# Patient Record
Sex: MALE | Race: WHITE | Employment: FULL TIME | URBAN - METROPOLITAN AREA
[De-identification: names, ages, dates, MRNs, and addresses within clinical notes are randomized per-mention and may not be internally consistent; named-entity substitution may affect disease eponyms.]

---

## 2018-06-18 ENCOUNTER — TELEPHONE (OUTPATIENT)
Dept: INTERNAL MEDICINE CLINIC | Age: 63
End: 2018-06-18

## 2018-07-03 ENCOUNTER — OFFICE VISIT (OUTPATIENT)
Dept: INTERNAL MEDICINE CLINIC | Age: 63
End: 2018-07-03

## 2018-07-03 VITALS
BODY MASS INDEX: 23.04 KG/M2 | HEIGHT: 68 IN | DIASTOLIC BLOOD PRESSURE: 60 MMHG | SYSTOLIC BLOOD PRESSURE: 108 MMHG | HEART RATE: 64 BPM | WEIGHT: 152 LBS | RESPIRATION RATE: 16 BRPM

## 2018-07-03 DIAGNOSIS — M72.2 PLANTAR FASCIITIS: Chronic | ICD-10-CM

## 2018-07-03 DIAGNOSIS — F41.9 ANXIETY AND DEPRESSION: Chronic | ICD-10-CM

## 2018-07-03 DIAGNOSIS — F32.A ANXIETY AND DEPRESSION: Chronic | ICD-10-CM

## 2018-07-03 PROCEDURE — 99203 OFFICE O/P NEW LOW 30 MIN: CPT | Performed by: INTERNAL MEDICINE

## 2018-07-03 RX ORDER — FLUOXETINE 20 MG/1
20 TABLET, FILM COATED ORAL DAILY
Qty: 30 TABLET | Refills: 3 | Status: SHIPPED | OUTPATIENT
Start: 2018-07-03 | End: 2019-03-16

## 2019-01-02 RX ORDER — FLUOXETINE HYDROCHLORIDE 20 MG/1
CAPSULE ORAL
Qty: 90 CAPSULE | Refills: 3 | Status: SHIPPED | OUTPATIENT
Start: 2019-01-02

## 2019-03-16 ENCOUNTER — HOSPITAL ENCOUNTER (EMERGENCY)
Age: 64
Discharge: HOME | End: 2019-03-16
Payer: COMMERCIAL

## 2019-03-16 VITALS
HEART RATE: 88 BPM | DIASTOLIC BLOOD PRESSURE: 72 MMHG | WEIGHT: 153 LBS | BODY MASS INDEX: 23.26 KG/M2 | SYSTOLIC BLOOD PRESSURE: 126 MMHG | RESPIRATION RATE: 16 BRPM | OXYGEN SATURATION: 97 % | TEMPERATURE: 97.9 F

## 2019-03-16 DIAGNOSIS — S20.222A CONTUSION OF LEFT SIDE OF BACK, INITIAL ENCOUNTER: Primary | ICD-10-CM

## 2019-03-16 PROCEDURE — 99283 EMERGENCY DEPT VISIT LOW MDM: CPT

## 2019-03-16 RX ORDER — IBUPROFEN 800 MG/1
800 TABLET ORAL EVERY 8 HOURS PRN
Qty: 30 TABLET | Refills: 0 | Status: SHIPPED | OUTPATIENT
Start: 2019-03-16

## 2019-03-16 RX ORDER — HYDROCODONE BITARTRATE AND ACETAMINOPHEN 5; 325 MG/1; MG/1
1 TABLET ORAL EVERY 6 HOURS PRN
Qty: 10 TABLET | Refills: 0 | Status: SHIPPED | OUTPATIENT
Start: 2019-03-16 | End: 2019-03-19

## 2019-03-16 RX ORDER — METHOCARBAMOL 750 MG/1
750 TABLET, FILM COATED ORAL 2 TIMES DAILY
Qty: 20 TABLET | Refills: 0 | Status: SHIPPED | OUTPATIENT
Start: 2019-03-16

## 2019-03-16 ASSESSMENT — ENCOUNTER SYMPTOMS
CHOKING: 0
SHORTNESS OF BREATH: 0
VOMITING: 0
NAUSEA: 0
EYE DISCHARGE: 0
EYE REDNESS: 0
APNEA: 0
FACIAL SWELLING: 0
ABDOMINAL PAIN: 0
BACK PAIN: 1

## 2019-03-16 ASSESSMENT — PAIN DESCRIPTION - PAIN TYPE
TYPE: ACUTE PAIN
TYPE: ACUTE PAIN

## 2019-03-16 ASSESSMENT — PAIN DESCRIPTION - DESCRIPTORS: DESCRIPTORS: ACHING

## 2019-03-16 ASSESSMENT — PAIN SCALES - GENERAL
PAINLEVEL_OUTOF10: 6
PAINLEVEL_OUTOF10: 6

## 2019-03-16 ASSESSMENT — PAIN DESCRIPTION - PROGRESSION
CLINICAL_PROGRESSION: NOT CHANGED
CLINICAL_PROGRESSION: NOT CHANGED

## 2019-03-16 ASSESSMENT — PAIN DESCRIPTION - ONSET: ONSET: ON-GOING

## 2019-03-16 ASSESSMENT — PAIN DESCRIPTION - ORIENTATION
ORIENTATION: LEFT;LOWER
ORIENTATION: LEFT;LOWER

## 2019-03-16 ASSESSMENT — PAIN DESCRIPTION - FREQUENCY: FREQUENCY: CONTINUOUS

## 2019-03-16 ASSESSMENT — PAIN DESCRIPTION - LOCATION
LOCATION: BACK
LOCATION: BACK

## 2025-07-21 ENCOUNTER — ANESTHESIA (OUTPATIENT)
Dept: ENDOSCOPY | Age: 70
End: 2025-07-21
Payer: COMMERCIAL

## 2025-07-21 ENCOUNTER — ANESTHESIA EVENT (OUTPATIENT)
Dept: ENDOSCOPY | Age: 70
End: 2025-07-21
Payer: COMMERCIAL

## 2025-07-21 ENCOUNTER — APPOINTMENT (OUTPATIENT)
Dept: CT IMAGING | Age: 70
End: 2025-07-21
Payer: COMMERCIAL

## 2025-07-21 ENCOUNTER — HOSPITAL ENCOUNTER (EMERGENCY)
Age: 70
Discharge: HOME | End: 2025-07-21
Attending: EMERGENCY MEDICINE
Payer: COMMERCIAL

## 2025-07-21 VITALS
TEMPERATURE: 98.9 F | HEIGHT: 68 IN | WEIGHT: 145.94 LBS | DIASTOLIC BLOOD PRESSURE: 67 MMHG | OXYGEN SATURATION: 93 % | RESPIRATION RATE: 16 BRPM | SYSTOLIC BLOOD PRESSURE: 107 MMHG | HEART RATE: 80 BPM | BODY MASS INDEX: 22.12 KG/M2

## 2025-07-21 DIAGNOSIS — K13.70 LESION OF MOUTH: ICD-10-CM

## 2025-07-21 DIAGNOSIS — T18.108A FOREIGN BODY IN ESOPHAGUS, INITIAL ENCOUNTER: Primary | ICD-10-CM

## 2025-07-21 LAB
ANION GAP SERPL CALCULATED.3IONS-SCNC: 12 MMOL/L (ref 3–16)
BASOPHILS # BLD: 0.1 K/UL (ref 0–0.2)
BASOPHILS NFR BLD: 1 %
BUN SERPL-MCNC: 13 MG/DL (ref 7–20)
CALCIUM SERPL-MCNC: 8.9 MG/DL (ref 8.3–10.6)
CHLORIDE SERPL-SCNC: 112 MMOL/L (ref 99–110)
CO2 SERPL-SCNC: 23 MMOL/L (ref 21–32)
CREAT SERPL-MCNC: 0.8 MG/DL (ref 0.8–1.3)
DEPRECATED RDW RBC AUTO: 13.7 % (ref 12.4–15.4)
EOSINOPHIL # BLD: 0.3 K/UL (ref 0–0.6)
EOSINOPHIL NFR BLD: 3.8 %
GFR SERPLBLD CREATININE-BSD FMLA CKD-EPI: >90 ML/MIN/{1.73_M2}
GLUCOSE SERPL-MCNC: 133 MG/DL (ref 70–99)
HCT VFR BLD AUTO: 31 % (ref 40.5–52.5)
HGB BLD-MCNC: 10.7 G/DL (ref 13.5–17.5)
LYMPHOCYTES # BLD: 1.7 K/UL (ref 1–5.1)
LYMPHOCYTES NFR BLD: 22.5 %
MAGNESIUM SERPL-MCNC: 2.07 MG/DL (ref 1.8–2.4)
MCH RBC QN AUTO: 31.9 PG (ref 26–34)
MCHC RBC AUTO-ENTMCNC: 34.5 G/DL (ref 31–36)
MCV RBC AUTO: 92.5 FL (ref 80–100)
MONOCYTES # BLD: 0.4 K/UL (ref 0–1.3)
MONOCYTES NFR BLD: 5.5 %
NEUTROPHILS # BLD: 5.1 K/UL (ref 1.7–7.7)
NEUTROPHILS NFR BLD: 67.2 %
PLATELET # BLD AUTO: 265 K/UL (ref 135–450)
PMV BLD AUTO: 8.8 FL (ref 5–10.5)
POTASSIUM SERPL-SCNC: 3.3 MMOL/L (ref 3.5–5.1)
RBC # BLD AUTO: 3.35 M/UL (ref 4.2–5.9)
SODIUM SERPL-SCNC: 147 MMOL/L (ref 136–145)
WBC # BLD AUTO: 7.5 K/UL (ref 4–11)

## 2025-07-21 PROCEDURE — 83735 ASSAY OF MAGNESIUM: CPT

## 2025-07-21 PROCEDURE — 7100000011 HC PHASE II RECOVERY - ADDTL 15 MIN: Performed by: INTERNAL MEDICINE

## 2025-07-21 PROCEDURE — 7100000010 HC PHASE II RECOVERY - FIRST 15 MIN: Performed by: INTERNAL MEDICINE

## 2025-07-21 PROCEDURE — 6360000002 HC RX W HCPCS: Performed by: PHYSICIAN ASSISTANT

## 2025-07-21 PROCEDURE — 2580000003 HC RX 258: Performed by: PHYSICIAN ASSISTANT

## 2025-07-21 PROCEDURE — 6360000002 HC RX W HCPCS: Performed by: ANESTHESIOLOGY

## 2025-07-21 PROCEDURE — 99285 EMERGENCY DEPT VISIT HI MDM: CPT

## 2025-07-21 PROCEDURE — 7100000001 HC PACU RECOVERY - ADDTL 15 MIN: Performed by: INTERNAL MEDICINE

## 2025-07-21 PROCEDURE — 70491 CT SOFT TISSUE NECK W/DYE: CPT

## 2025-07-21 PROCEDURE — 7100000000 HC PACU RECOVERY - FIRST 15 MIN: Performed by: INTERNAL MEDICINE

## 2025-07-21 PROCEDURE — 96374 THER/PROPH/DIAG INJ IV PUSH: CPT

## 2025-07-21 PROCEDURE — 85025 COMPLETE CBC W/AUTO DIFF WBC: CPT

## 2025-07-21 PROCEDURE — 6360000004 HC RX CONTRAST MEDICATION: Performed by: PHYSICIAN ASSISTANT

## 2025-07-21 PROCEDURE — 3700000000 HC ANESTHESIA ATTENDED CARE: Performed by: INTERNAL MEDICINE

## 2025-07-21 PROCEDURE — 2580000003 HC RX 258: Performed by: ANESTHESIOLOGY

## 2025-07-21 PROCEDURE — 3700000001 HC ADD 15 MINUTES (ANESTHESIA): Performed by: INTERNAL MEDICINE

## 2025-07-21 PROCEDURE — 3609012900 HC EGD FOREIGN BODY REMOVAL: Performed by: INTERNAL MEDICINE

## 2025-07-21 PROCEDURE — 2709999900 HC NON-CHARGEABLE SUPPLY: Performed by: INTERNAL MEDICINE

## 2025-07-21 PROCEDURE — 80048 BASIC METABOLIC PNL TOTAL CA: CPT

## 2025-07-21 RX ORDER — SODIUM CHLORIDE 9 MG/ML
INJECTION, SOLUTION INTRAVENOUS PRN
Status: DISCONTINUED | OUTPATIENT
Start: 2025-07-21 | End: 2025-07-22 | Stop reason: HOSPADM

## 2025-07-21 RX ORDER — SODIUM CHLORIDE 0.9 % (FLUSH) 0.9 %
5-40 SYRINGE (ML) INJECTION PRN
Status: DISCONTINUED | OUTPATIENT
Start: 2025-07-21 | End: 2025-07-22 | Stop reason: HOSPADM

## 2025-07-21 RX ORDER — LIDOCAINE HYDROCHLORIDE 20 MG/ML
INJECTION, SOLUTION INFILTRATION; PERINEURAL
Status: DISCONTINUED | OUTPATIENT
Start: 2025-07-21 | End: 2025-07-21 | Stop reason: SDUPTHER

## 2025-07-21 RX ORDER — SODIUM CHLORIDE 9 MG/ML
INJECTION, SOLUTION INTRAVENOUS
Status: DISCONTINUED | OUTPATIENT
Start: 2025-07-21 | End: 2025-07-21 | Stop reason: SDUPTHER

## 2025-07-21 RX ORDER — ONDANSETRON 2 MG/ML
4 INJECTION INTRAMUSCULAR; INTRAVENOUS
Status: DISCONTINUED | OUTPATIENT
Start: 2025-07-21 | End: 2025-07-22 | Stop reason: HOSPADM

## 2025-07-21 RX ORDER — SODIUM CHLORIDE 9 MG/ML
INJECTION, SOLUTION INTRAVENOUS CONTINUOUS
Status: DISCONTINUED | OUTPATIENT
Start: 2025-07-21 | End: 2025-07-22 | Stop reason: HOSPADM

## 2025-07-21 RX ORDER — ONDANSETRON 2 MG/ML
INJECTION INTRAMUSCULAR; INTRAVENOUS
Status: DISCONTINUED | OUTPATIENT
Start: 2025-07-21 | End: 2025-07-21 | Stop reason: SDUPTHER

## 2025-07-21 RX ORDER — IOPAMIDOL 755 MG/ML
75 INJECTION, SOLUTION INTRAVASCULAR
Status: COMPLETED | OUTPATIENT
Start: 2025-07-21 | End: 2025-07-21

## 2025-07-21 RX ORDER — DEXAMETHASONE SODIUM PHOSPHATE 10 MG/ML
INJECTION, SOLUTION INTRAMUSCULAR; INTRAVENOUS
Status: DISCONTINUED | OUTPATIENT
Start: 2025-07-21 | End: 2025-07-21 | Stop reason: SDUPTHER

## 2025-07-21 RX ORDER — GLUCAGON 1 MG/ML
1 KIT INJECTION ONCE
Status: COMPLETED | OUTPATIENT
Start: 2025-07-21 | End: 2025-07-21

## 2025-07-21 RX ORDER — PROPOFOL 10 MG/ML
INJECTION, EMULSION INTRAVENOUS
Status: DISCONTINUED | OUTPATIENT
Start: 2025-07-21 | End: 2025-07-21 | Stop reason: SDUPTHER

## 2025-07-21 RX ORDER — SODIUM CHLORIDE 0.9 % (FLUSH) 0.9 %
5-40 SYRINGE (ML) INJECTION EVERY 12 HOURS SCHEDULED
Status: DISCONTINUED | OUTPATIENT
Start: 2025-07-21 | End: 2025-07-22 | Stop reason: HOSPADM

## 2025-07-21 RX ADMIN — GLUCAGON 1 MG: 1 INJECTION, POWDER, LYOPHILIZED, FOR SOLUTION INTRAMUSCULAR; INTRAVENOUS at 17:56

## 2025-07-21 RX ADMIN — DEXAMETHASONE SODIUM PHOSPHATE 10 MG: 10 INJECTION, SOLUTION INTRAMUSCULAR; INTRAVENOUS at 21:18

## 2025-07-21 RX ADMIN — SODIUM CHLORIDE: 9 INJECTION, SOLUTION INTRAVENOUS at 21:14

## 2025-07-21 RX ADMIN — IOPAMIDOL 75 ML: 755 INJECTION, SOLUTION INTRAVENOUS at 19:09

## 2025-07-21 RX ADMIN — ONDANSETRON 4 MG: 2 INJECTION INTRAMUSCULAR; INTRAVENOUS at 21:18

## 2025-07-21 RX ADMIN — LIDOCAINE HYDROCHLORIDE 80 MG: 20 INJECTION, SOLUTION INFILTRATION; PERINEURAL at 21:18

## 2025-07-21 RX ADMIN — PROPOFOL 120 MG: 10 INJECTION, EMULSION INTRAVENOUS at 21:18

## 2025-07-21 RX ADMIN — SODIUM CHLORIDE: 0.9 INJECTION, SOLUTION INTRAVENOUS at 20:38

## 2025-07-21 ASSESSMENT — PAIN DESCRIPTION - PAIN TYPE
TYPE: SURGICAL PAIN

## 2025-07-21 ASSESSMENT — PAIN DESCRIPTION - DESCRIPTORS
DESCRIPTORS: DISCOMFORT

## 2025-07-21 ASSESSMENT — PAIN DESCRIPTION - FREQUENCY
FREQUENCY: INTERMITTENT

## 2025-07-21 ASSESSMENT — ENCOUNTER SYMPTOMS
NAUSEA: 0
ABDOMINAL PAIN: 0
RHINORRHEA: 0
SHORTNESS OF BREATH: 0
TROUBLE SWALLOWING: 1
SORE THROAT: 0
DIARRHEA: 0
COLOR CHANGE: 0
VOMITING: 0
COUGH: 0
EYE REDNESS: 0

## 2025-07-21 ASSESSMENT — PAIN DESCRIPTION - ORIENTATION
ORIENTATION: INNER

## 2025-07-21 ASSESSMENT — PAIN SCALES - GENERAL
PAINLEVEL_OUTOF10: 0
PAINLEVEL_OUTOF10: 3
PAINLEVEL_OUTOF10: 3
PAINLEVEL_OUTOF10: 0
PAINLEVEL_OUTOF10: 3
PAINLEVEL_OUTOF10: 3
PAINLEVEL_OUTOF10: 0

## 2025-07-21 ASSESSMENT — LIFESTYLE VARIABLES
HOW OFTEN DO YOU HAVE A DRINK CONTAINING ALCOHOL: MONTHLY OR LESS
HOW MANY STANDARD DRINKS CONTAINING ALCOHOL DO YOU HAVE ON A TYPICAL DAY: 1 OR 2

## 2025-07-21 ASSESSMENT — PAIN DESCRIPTION - LOCATION
LOCATION: THROAT

## 2025-07-21 ASSESSMENT — PAIN DESCRIPTION - ONSET
ONSET: ON-GOING

## 2025-07-22 NOTE — DISCHARGE INSTRUCTIONS
Endoscopy Discharge Instructions    Call Dr. Muro [phone] if you have any questions.    You may be drowsy or lightheaded after receiving sedation.  DO NOT operate  a vehicle (automobile, bicycle, motorcycle, machinery, or power tools), no  alcoholic beverages, and do not make any important decisions today.                 Plan on bed rest or quiet relaxation today.  Resume normal activities in the morning.     Resume normal activity tomorrow unless otherwise advised by your physician.            Eat a light first meal, avoiding spicy and fatty foods, then resume normal diet unless  you are told otherwise by your physician.    If the intravenous medication site is painful, apply warm compresses on the site until the soreness is relieved and elevate the arm above the heart. Call your physician if no improvement  in 2-3 days.       POSSIBLE SYMPTOMS TO WATCH:     1. fever (greater than 100) 5. increased abdominal bloating   2. severe pain   6. excessive bleeding   3. nausea and vomiting  7. chest pain   4. chills    8. shortness of breath       Notify us if these problems occur     Expected as normal and remedies:  Sore throat: use over the counter throat lozenges or gargle with warm salt water.  Redness or soreness at the IV site: apply warm compress  Gaseous discomfort: belching or passing flatus (gas).    Findings:         Signed                             Endoscopy Note                   Findings:     1) Retained esophageal meat bolus (? Corndog) was noted in the cervical esophagus just distal to the esophageal inlet.  There was no obvious stricture or mass noted in the upper esophagus.  The mucosal in the proximal esophagus was irritated likely from the mechanical trauma of the food bolus.   2) The remainder of the stomach was normal appearing.  The food bolus was able to be gently advanced into the stomach without difficulty.   3) The gastroesophageal junction was at 38 cm from the incisors.  There was

## 2025-07-22 NOTE — ANESTHESIA PRE PROCEDURE
[facility] Department of Anesthesiology  Pre-Anesthesia Evaluation/Consultation       Name:  Prince Max  : 1955  Age:  70 y.o.                                           MRN:  [id number]  Date: 2025           Surgeon: Surgeon(s):  Ulises Lee Jr., DO    Procedure: Procedure(s):  ESOPHAGOGASTRODUODENOSCOPY     No Known Allergies  Patient Active Problem List   Diagnosis    Plantar fasciitis    Anxiety and depression     Past Medical History:   Diagnosis Date    Anxiety and depression 2018    Dysphagia 2025    foreign body in esophagus     No past surgical history on file.  Social History     Tobacco Use    Smoking status: Every Day     Current packs/day: 0.20     Average packs/day: 0.2 packs/day for 15.0 years (3.0 ttl pk-yrs)     Types: Cigarettes    Smokeless tobacco: Never   Substance Use Topics    Alcohol use: No    Drug use: Never     Medications  No current facility-administered medications on file prior to encounter.     Current Outpatient Medications on File Prior to Encounter   Medication Sig Dispense Refill    ibuprofen (ADVIL;MOTRIN) 800 MG tablet Take 1 tablet by mouth every 8 hours as needed for Pain 30 tablet 0    methocarbamol (ROBAXIN-750) 750 MG tablet Take 1 tablet by mouth 2 times daily 20 tablet 0    FLUoxetine (PROZAC) 20 MG capsule TAKE 1 CAPSULE BY MOUTH DAILY 90 capsule 3     Current Facility-Administered Medications   Medication Dose Route Frequency Provider Last Rate Last Admin    0.9 % sodium chloride infusion   IntraVENous Continuous Randa Shafer  mL/hr at 25 New Bag at 25     Current Outpatient Medications   Medication Sig Dispense Refill    ibuprofen (ADVIL;MOTRIN) 800 MG tablet Take 1 tablet by mouth every 8 hours as needed for Pain 30 tablet 0    methocarbamol (ROBAXIN-750) 750 MG tablet Take 1 tablet by mouth 2 times daily 20 tablet 0    FLUoxetine (PROZAC) 20 MG capsule TAKE 1 CAPSULE BY MOUTH DAILY 90 capsule 3

## 2025-07-22 NOTE — FLOWSHEET NOTE
RN contacted the ER.  Dr. Barone was not there.  Dr. Pace took over.  He said that he was OK with discharging pt. From PACU. He wanted the patient to follow up with and ENT r/t the nodules found at the base of the tongue. RN suggested the pt. Contact Dr. Roth or Dr. Rojas.  Dr. Pace said that these doctors are the ones he would suggest. Dr. Muro updated.  Pt. Was picked up by his Daughter and wife at the discharge bridge.

## 2025-07-22 NOTE — OP NOTE
Endoscopy Note    Patient: Prince Max  : 1955  Acct#:     Procedure: Esophagogastroduodenoscopy with esophageal food bolus removal                         Date:  2025     Surgeon:   Ulises Lee Jr, DO    Referring Physician:  Thompson Cox MD    Indications: This is a 70 y.o. year old male who presents today with esophageal food bolus.      Postoperative Diagnosis:  1) Retained esophageal meat bolus (? Corndog) was noted in the cervical esophagus just distal to the esophageal inlet.  There was no obvious stricture or mass noted in the upper esophagus.  The mucosal in the proximal esophagus was irritated likely from the mechanical trauma of the food bolus.   2) The remainder of the stomach was normal appearing.  The food bolus was able to be gently advanced into the stomach without difficulty.   3) The gastroesophageal junction was at 38 cm from the incisors.  There was no esophagitis or Ray's esophagus noted.  4) A small 1-2 cm sliding hiatal hernia was noted.  The remainder of the stomach was normal appearing.  4) There was a normal appearing duodenal bulb and second portion of the duodenum.  5) There was some nodularity noted at the base of the tongue.  This was not biopsied.     Anesthesia:  The patient was administered TIVA per anesthesiology team.  Please see their operative records for full details of medications administered.       Consent:  The patient or their legal guardian has signed an informed consent, and is aware of the potential risks, benefits, alternatives, and potential complications of this procedure.  These include, but are not limited to hemorrhage, bleeding, post procedural pain, perforation, phlebitis, aspiration, hypotension, hypoxia, cardiovascular events such as arryhthmia, and possibly death.     Description of Procedure: The patient was then taken to the endoscopy suite, placed in the left lateral decubitus position and the above IV

## 2025-07-22 NOTE — ED PROVIDER NOTES
[facility]  EMERGENCY DEPARTMENT ENCOUNTER      Pt Name: Prince Max  MRN: [id number]  Birthdate 1955  Date of evaluation: 7/21/2025  Provider: KRISTOPHER LINARES DO    CHIEF COMPLAINT  Chief Complaint   Patient presents with    Dysphagia     Pt into Ed for dysphagia. Pt states he was eating a hot dog for lunch and it got stuck in her throat. Pt still able to move air but states he can't swallow saliva or any fluid. Pt is A&O x4.        I have fully participated in the care of Prince Max and have had a face-to-face evaluation. I have reviewed and agree with all pertinent clinical information, and midlevel provider's history, and physical exam. I have also reviewed the labs and imaging studies and treatment plan. I have also reviewed and agree with the medications, allergies and past medical history section for this Prince Max. I agree with the diagnosis, and I concur.    I personally saw the patient and made/approved the management plan and take responsibility for the patient management.      This patient is at risk for a communicable infection.  Therefore, personal protection equipment consisting of a mask was worn for the exam.    Past Medical History:   Diagnosis Date    Anxiety and depression 07/03/2018    Dysphagia 07/2025    foreign body in esophagus       MDM:  History: Prince Max is a 70 y.o. male who presents with eating a hot dog and it got stuck in his esophagus.  He has been able to handle secretions that occurred earlier today.  He was sent from [place] ED.    Physical Exam: Patient alert and orient x 3.  He is phonating okay.  He appears in mild to moderate pain with swallowing.  Heart is regular rate and rhythm without murmurs clicks or rubs.  Lungs are clear auscultation equal bilaterally.     Medical decision making: Gastroenterology was notified they will be in to take the patient to endoscopy for removal of esophageal foreign body.    Vitals:    07/21/25 2155   BP: 
transferred into the endoscopy team's care in stable condition.    Patient taken for endoscopy with successful removal of esophageal food bolus.  He is currently recovering in PACU.  Plan is for discharge as long as he recovers as expected and is able to find a ride home.     Disposition Considerations (tests considered but not done, Admit vs D/C, Shared Decision Making, Pt Expectation of Test or Tx.):        I am the Primary Clinician of Record.  FINAL IMPRESSION      1. Foreign body in esophagus, initial encounter    2. Lesion of mouth          DISPOSITION/PLAN     DISPOSITION Decision To Discharge 07/23/2025 02:00:55 PM   DISPOSITION CONDITION Stable           PATIENT REFERRED TO:  [facility]  [address]  [place] [address]  [phone]  Schedule an appointment as soon as possible for a visit in 3 days        DISCHARGE MEDICATIONS:  Discharge Medication List as of 7/21/2025 10:20 PM          DISCONTINUED MEDICATIONS:  Discharge Medication List as of 7/21/2025 10:20 PM                 (Please note that portions of this note were completed with a voice recognition program.  Efforts were made to edit the dictations but occasionally words are mis-transcribed.)    ANDREA Andrade (electronically signed)        Randa Shafer PA  07/27/25 [id number]

## 2025-07-22 NOTE — PROGRESS NOTES
Patient to phase 2 from PACU.  Received report from Amanda ROMAN at bedside. VSS. Patient alert and oriented x4.  Pain rating 0/10.

## 2025-07-22 NOTE — PROGRESS NOTES
Pt discharged to home. Transportation here with wheelchair. Accompanied by daughter Carolyne. Transported in personal vehicle. Discharge instructions and personal belongings given to pt. Explanation of discharge instructions understood by verbal statement. No questions, comments or concerns at this time.

## 2025-07-22 NOTE — FLOWSHEET NOTE
Pt. Arrived in PACU.  Awake, talking, c/o throat pain. Rates his pain a 3 or 4.  Tolerating ice chips.

## 2025-07-22 NOTE — H&P
Pre-operative History and Physical    Patient: Prince Max  : 1955  Acct#:     Intended Procedure:  EGD    HISTORY OF PRESENT ILLNESS:  The patient is a 70 y.o. male  who presents for/due to inability to tolerate secretions after eating a bite of hot dog.  Concern for esophageal food bolus obstruction.       Past Medical History:        Diagnosis Date    Anxiety and depression 2018    Dysphagia 2025    foreign body in esophagus     Past Surgical History:    No past surgical history on file.  Medications Prior to Admission:   No current facility-administered medications on file prior to encounter.     Current Outpatient Medications on File Prior to Encounter   Medication Sig Dispense Refill    ibuprofen (ADVIL;MOTRIN) 800 MG tablet Take 1 tablet by mouth every 8 hours as needed for Pain 30 tablet 0    methocarbamol (ROBAXIN-750) 750 MG tablet Take 1 tablet by mouth 2 times daily 20 tablet 0    FLUoxetine (PROZAC) 20 MG capsule TAKE 1 CAPSULE BY MOUTH DAILY 90 capsule 3        Allergies:  Patient has no known allergies.    Social History:   TOBACCO:   reports that he has been smoking cigarettes. He has a 3 pack-year smoking history. He has never used smokeless tobacco.  ETOH:   reports no history of alcohol use.  DRUGS:   reports no history of drug use.    PHYSICAL EXAM:      Vital Signs: /76   Pulse 95   Temp 98.6 °F (37 °C)   Resp 18   Ht 1.727 m (5' 8\")   Wt 66.2 kg (145 lb 15.1 oz)   SpO2 97%   BMI 22.19 kg/m²    Airway: No stridor or wheezing noted.  Good air movement  Pulmonary: without wheezes.  Clear to auscultation  Cardiac:regular rate and rhythm without loud murmurs  Abdomen:soft, nontender,  Bowel sounds present    Pre-Procedure Assessment / Plan:  1) Suspected esophageal food bolus    ASA Grade:  ASA 3 - Patient with moderate systemic disease with functional limitations  Mallampati Classification:  Class II    Level of Sedation Plan:Deep sedation    Post Procedure

## 2025-07-22 NOTE — ANESTHESIA POSTPROCEDURE EVALUATION
[facility] Department of Anesthesiology  Post-Anesthesia Note       Name:  Prince Max                                  Age:  70 y.o.  MRN:  [id number]     Last Vitals & Oxygen Saturation: BP (!) 102/55   Pulse 84   Temp 98.5 °F (36.9 °C) (Temporal)   Resp 17   Ht 1.727 m (5' 8\")   Wt 66.2 kg (145 lb 15.1 oz)   SpO2 98%   BMI 22.19 kg/m²   Patient Vitals for the past 4 hrs:   BP Temp Temp src Pulse Resp SpO2   07/21/25 2140 (!) 102/55 -- -- 84 17 98 %   07/21/25 2138 104/65 98.5 °F (36.9 °C) Temporal 82 20 97 %   07/21/25 1745 -- -- -- 95 -- --       Level of consciousness:  Awake, alert to baseline    Respiratory: Respirations easy, no distress. Stable.    Cardiovascular: Hemodynamically stable.    Hydration: Adequate.    PONV: Adequately managed.    Post-op pain: Adequately controlled.    Post-op assessment: Tolerated anesthetic well without complication.    Complications:  None.    Seb Fu MD  July 21, 2025   9:44 PM

## (undated) DEVICE — BITE BLOCK ENDOSCP AD 60 FR W/ ADJ STRP PLAS GRN BLOX

## (undated) DEVICE — ENDOSCOPY KIT: Brand: MEDLINE INDUSTRIES, INC.